# Patient Record
(demographics unavailable — no encounter records)

---

## 2024-10-11 NOTE — HISTORY OF PRESENT ILLNESS
[FreeTextEntry1] : 25 year old F with ?HTN who presents for evaluation of CP and SOB.  EKG 10/9/24 showed sinus rhythm with possible LVH  Pt reports 3 weeks ago, she started to experience mid-sternal chest pain and SOB that occurs intermittently throughout the day, lasting few seconds at a time. She feels like the symptoms worsen with cleaning the house and exertion. She saw Dr. Dunlap 10/9/24 who noted her BP was elevated to systolic 150s, ultimately pt was started on hydralazine 10mg which she is not currently taking. BP today 137/89. She reports 1 episode of dizziness/palpitation/sweatiness at work (works in [employer]) in the past, but has not recurred. No orthopnea, PND, LE edema. No LOC. No recent viral illness no recent travel.

## 2024-10-11 NOTE — ASSESSMENT
[FreeTextEntry1] : 25 year old F with ?HTN who presents for evaluation of CP and SOB.  EKG 10/9/24 showed sinus rhythm with possible LVH  Pt reports 3 weeks ago, she started to experience mid-sternal chest pain and SOB that occurs intermittently throughout the day, lasting few seconds at a time. She feels like the symptoms worsen with cleaning the house and exertion. She saw Dr. Dunlap 10/9/24 who noted her BP was elevated to systolic 150s, ultimately pt was started on hydralazine 10mg which she is not currently taking. BP today 137/89. She reports 1 episode of dizziness/palpitation/sweatiness at work (works in [employer]) in the past, but has not recurred. No orthopnea, PND, LE edema. No LOC. No recent viral illness no recent travel.  1) Chest discomfort, feels worse with exertion 2) SOB on exertion - EKG 10/9/24 showed sinus rhythm with upsloping ST elevation, possible LVH - No recent travel or recent viral illness. She is not tachycardic and O2 Sat is 98% on RA. She is not on OCPs. - Low suspicion for PE. D-Dimer was also checked, which was negative. - I advised pt to undergo treadmill stress test; pt only did 3 min Jesus protocol, achieved 4.6 METs, stopping due to SOB. There were no ischemic ECG changes - Given significant SOB on exertion, I advised pt to undergo TTE to r/o structural heart disease and valvular pathology  3) Elevated BP - BP today 137/89 and pt did not take hydralazine. Will monitor for now  4) Follow-up, pending TTE